# Patient Record
Sex: FEMALE | Race: BLACK OR AFRICAN AMERICAN | NOT HISPANIC OR LATINO | URBAN - METROPOLITAN AREA
[De-identification: names, ages, dates, MRNs, and addresses within clinical notes are randomized per-mention and may not be internally consistent; named-entity substitution may affect disease eponyms.]

---

## 2024-10-29 ENCOUNTER — OFFICE VISIT (OUTPATIENT)
Dept: URBAN - METROPOLITAN AREA CLINIC 25 | Facility: CLINIC | Age: 44
End: 2024-10-29
Payer: COMMERCIAL

## 2024-10-29 ENCOUNTER — DASHBOARD ENCOUNTERS (OUTPATIENT)
Age: 44
End: 2024-10-29

## 2024-10-29 VITALS
TEMPERATURE: 98.1 F | HEIGHT: 62 IN | WEIGHT: 180.4 LBS | BODY MASS INDEX: 33.2 KG/M2 | HEART RATE: 71 BPM | DIASTOLIC BLOOD PRESSURE: 81 MMHG | SYSTOLIC BLOOD PRESSURE: 137 MMHG

## 2024-10-29 DIAGNOSIS — K21.9 CHRONIC GERD: ICD-10-CM

## 2024-10-29 DIAGNOSIS — R13.12 OROPHARYNGEAL DYSPHAGIA: ICD-10-CM

## 2024-10-29 DIAGNOSIS — R13.19 ESOPHAGEAL DYSPHAGIA: ICD-10-CM

## 2024-10-29 PROBLEM — 71457002: Status: ACTIVE | Noted: 2024-10-29

## 2024-10-29 PROBLEM — 235595009: Status: ACTIVE | Noted: 2024-10-29

## 2024-10-29 PROCEDURE — 99204 OFFICE O/P NEW MOD 45 MIN: CPT | Performed by: INTERNAL MEDICINE

## 2024-10-29 NOTE — HPI-TODAY'S VISIT:
43 yo pt with h/o small hiatal hernia presents with c/o dysphagia to solids for for few weeks with subsequent EGD with nonobstructing schatzki's ring and an empiric dilation was performed. She is improved.

## 2025-01-16 ENCOUNTER — OFFICE VISIT (OUTPATIENT)
Dept: URBAN - METROPOLITAN AREA CLINIC 25 | Facility: CLINIC | Age: 45
End: 2025-01-16

## 2025-04-14 ENCOUNTER — TELEPHONE ENCOUNTER (OUTPATIENT)
Dept: URBAN - METROPOLITAN AREA CLINIC 84 | Facility: CLINIC | Age: 45
End: 2025-04-14

## 2025-04-18 ENCOUNTER — TELEPHONE ENCOUNTER (OUTPATIENT)
Dept: URBAN - METROPOLITAN AREA CLINIC 84 | Facility: CLINIC | Age: 45
End: 2025-04-18

## 2025-04-18 RX ORDER — ONDANSETRON 4 MG/1
1 CAPSULE TABLET, ORALLY DISINTEGRATING ORAL TWICE A DAY
Qty: 14 CAPSULE | Refills: 0 | OUTPATIENT
Start: 2025-04-18 | End: 2025-04-25